# Patient Record
Sex: MALE | Employment: OTHER | URBAN - NONMETROPOLITAN AREA
[De-identification: names, ages, dates, MRNs, and addresses within clinical notes are randomized per-mention and may not be internally consistent; named-entity substitution may affect disease eponyms.]

---

## 2018-02-14 ENCOUNTER — OUTSIDE SERVICES (OUTPATIENT)
Dept: FAMILY MEDICINE CLINIC | Age: 83
End: 2018-02-14
Payer: MEDICARE

## 2018-02-14 DIAGNOSIS — I25.10 CORONARY ARTERY DISEASE INVOLVING NATIVE HEART WITHOUT ANGINA PECTORIS, UNSPECIFIED VESSEL OR LESION TYPE: ICD-10-CM

## 2018-02-14 DIAGNOSIS — R29.898 COGWHEEL RIGIDITY: ICD-10-CM

## 2018-02-14 DIAGNOSIS — R25.1 TREMOR: ICD-10-CM

## 2018-02-14 DIAGNOSIS — F03.91 DEMENTIA WITH BEHAVIORAL DISTURBANCE, UNSPECIFIED DEMENTIA TYPE: ICD-10-CM

## 2018-02-14 DIAGNOSIS — E78.5 HYPERLIPIDEMIA, UNSPECIFIED HYPERLIPIDEMIA TYPE: ICD-10-CM

## 2018-02-14 DIAGNOSIS — F41.9 ANXIETY: ICD-10-CM

## 2018-02-14 DIAGNOSIS — R25.8 BRADYKINESIA: ICD-10-CM

## 2018-02-14 DIAGNOSIS — G47.33 OSA (OBSTRUCTIVE SLEEP APNEA): ICD-10-CM

## 2018-02-14 DIAGNOSIS — K21.9 GASTROESOPHAGEAL REFLUX DISEASE WITHOUT ESOPHAGITIS: ICD-10-CM

## 2018-02-14 DIAGNOSIS — G40.909 SEIZURE DISORDER (HCC): ICD-10-CM

## 2018-02-14 DIAGNOSIS — I10 ESSENTIAL HYPERTENSION: Primary | ICD-10-CM

## 2018-02-14 PROCEDURE — 1123F ACP DISCUSS/DSCN MKR DOCD: CPT | Performed by: FAMILY MEDICINE

## 2018-02-14 PROCEDURE — 99309 SBSQ NF CARE MODERATE MDM 30: CPT | Performed by: FAMILY MEDICINE

## 2018-02-14 NOTE — PROGRESS NOTES
[address] [address]  [place] [id number]  Dept: [phone]      Merritt Kasper is a 80 y.o. male being seen for his routine visit. Location of visit: [facility]    HPI:  Admission History: Yanely Dee was admitted to [facility] on 7/16/16 after his wife took him to [facility] for worsening mental status and dementia with aggression stating that she can no longer take care of him. He was extremely agitated and combative while there. He was discharged to [place] [address] on 9/2/16. He is a [facility]    Last visit: No changes were made    Faxes since last seen: 1/30 fever, cough- Flu test, CXR, Rocephin 1 gram IM q24hrs x 3 days  1/30 CXR- R pneumonia- Albuterol UD aerosol x 5 days, recheck CXR in 3 days  2/2 CXR - pneumonia was clearing    New today: He has been getting up and sitting in his chair during the day and is doing well  The nurses report less behaviors  He is recovering well from his pneumonia    ROS:  General Constitutional: Denies fever. Denies lightheadedness. Respiratory: Denies cough. Denies shortness of breath. Denies wheezing. Cardiovascular: Denies chest pain at rest. Denies irregular heartbeat. Gastrointestinal: Denies abdominal pain. Denies blood in the stool. Denies constipation. Denies diarrhea. Denies nausea. Denies vomiting. Genitourinary: Denies blood in the urine. Denies difficulty urinating. Denies frequent urination. Denies painful urination. Skin:  Denies rash. Neurologic: Denies falls. Denies dizziness. Psychiatric: Denies sleep disturbance. Denies anxiety. Denies depressed mood. PHYSICAL EXAM:    General Appearance: in no acute distress, well developed, well nourished, asleep in bed, rouses with touch  Eyes: pupils equal, round reactive to light and accommodation. .  Nose: nares patent, no lesions.   Oral Cavity: mucosa moist.  Neck/Thyroid:  no cervical lymphadenopathy, no thyromegaly  Skin: warm and dry  Heart:

## 2018-03-14 ENCOUNTER — OUTSIDE SERVICES (OUTPATIENT)
Dept: FAMILY MEDICINE CLINIC | Age: 83
End: 2018-03-14
Payer: MEDICARE

## 2018-03-14 DIAGNOSIS — G40.909 SEIZURE DISORDER (HCC): ICD-10-CM

## 2018-03-14 DIAGNOSIS — I25.10 CORONARY ARTERY DISEASE INVOLVING NATIVE HEART WITHOUT ANGINA PECTORIS, UNSPECIFIED VESSEL OR LESION TYPE: ICD-10-CM

## 2018-03-14 DIAGNOSIS — K21.9 GASTROESOPHAGEAL REFLUX DISEASE WITHOUT ESOPHAGITIS: ICD-10-CM

## 2018-03-14 DIAGNOSIS — R25.8 BRADYKINESIA: ICD-10-CM

## 2018-03-14 DIAGNOSIS — F41.9 ANXIETY: ICD-10-CM

## 2018-03-14 DIAGNOSIS — F03.91 DEMENTIA WITH BEHAVIORAL DISTURBANCE, UNSPECIFIED DEMENTIA TYPE: ICD-10-CM

## 2018-03-14 DIAGNOSIS — E78.5 HYPERLIPIDEMIA, UNSPECIFIED HYPERLIPIDEMIA TYPE: ICD-10-CM

## 2018-03-14 DIAGNOSIS — R29.898 COGWHEEL RIGIDITY: ICD-10-CM

## 2018-03-14 DIAGNOSIS — I10 ESSENTIAL HYPERTENSION: Primary | ICD-10-CM

## 2018-03-14 DIAGNOSIS — R25.1 TREMOR: ICD-10-CM

## 2018-03-14 DIAGNOSIS — G47.33 OSA (OBSTRUCTIVE SLEEP APNEA): ICD-10-CM

## 2018-03-14 PROBLEM — F03.918 DEMENTIA WITH BEHAVIORAL DISTURBANCE: Status: ACTIVE | Noted: 2018-03-14

## 2018-03-14 PROCEDURE — 99309 SBSQ NF CARE MODERATE MDM 30: CPT | Performed by: FAMILY MEDICINE

## 2018-03-14 PROCEDURE — 1123F ACP DISCUSS/DSCN MKR DOCD: CPT | Performed by: FAMILY MEDICINE

## 2018-04-25 ENCOUNTER — OUTSIDE SERVICES (OUTPATIENT)
Dept: FAMILY MEDICINE CLINIC | Age: 83
End: 2018-04-25
Payer: MEDICARE

## 2018-04-25 DIAGNOSIS — I25.10 CORONARY ARTERY DISEASE INVOLVING NATIVE HEART WITHOUT ANGINA PECTORIS, UNSPECIFIED VESSEL OR LESION TYPE: ICD-10-CM

## 2018-04-25 DIAGNOSIS — G47.33 OSA (OBSTRUCTIVE SLEEP APNEA): ICD-10-CM

## 2018-04-25 DIAGNOSIS — R25.1 TREMOR: ICD-10-CM

## 2018-04-25 DIAGNOSIS — I10 ESSENTIAL HYPERTENSION: Primary | ICD-10-CM

## 2018-04-25 DIAGNOSIS — R29.898 COGWHEEL RIGIDITY: ICD-10-CM

## 2018-04-25 DIAGNOSIS — R25.8 BRADYKINESIA: ICD-10-CM

## 2018-04-25 DIAGNOSIS — E78.5 HYPERLIPIDEMIA, UNSPECIFIED HYPERLIPIDEMIA TYPE: ICD-10-CM

## 2018-04-25 DIAGNOSIS — K21.9 GASTROESOPHAGEAL REFLUX DISEASE WITHOUT ESOPHAGITIS: ICD-10-CM

## 2018-04-25 DIAGNOSIS — F41.9 ANXIETY: ICD-10-CM

## 2018-04-25 DIAGNOSIS — F03.91 DEMENTIA WITH BEHAVIORAL DISTURBANCE, UNSPECIFIED DEMENTIA TYPE: ICD-10-CM

## 2018-04-25 PROCEDURE — 1123F ACP DISCUSS/DSCN MKR DOCD: CPT | Performed by: FAMILY MEDICINE

## 2018-04-25 PROCEDURE — 99308 SBSQ NF CARE LOW MDM 20: CPT | Performed by: FAMILY MEDICINE

## 2018-05-23 ENCOUNTER — OUTSIDE SERVICES (OUTPATIENT)
Dept: FAMILY MEDICINE CLINIC | Age: 83
End: 2018-05-23
Payer: MEDICARE

## 2018-05-23 DIAGNOSIS — F41.9 ANXIETY: ICD-10-CM

## 2018-05-23 DIAGNOSIS — F03.91 DEMENTIA WITH BEHAVIORAL DISTURBANCE, UNSPECIFIED DEMENTIA TYPE: ICD-10-CM

## 2018-05-23 DIAGNOSIS — I10 ESSENTIAL HYPERTENSION: Primary | ICD-10-CM

## 2018-05-23 DIAGNOSIS — E78.5 HYPERLIPIDEMIA, UNSPECIFIED HYPERLIPIDEMIA TYPE: ICD-10-CM

## 2018-05-23 PROCEDURE — 99309 SBSQ NF CARE MODERATE MDM 30: CPT | Performed by: FAMILY MEDICINE

## 2018-05-23 PROCEDURE — 1123F ACP DISCUSS/DSCN MKR DOCD: CPT | Performed by: FAMILY MEDICINE

## 2018-06-27 ENCOUNTER — OUTSIDE SERVICES (OUTPATIENT)
Dept: FAMILY MEDICINE CLINIC | Age: 83
End: 2018-06-27
Payer: MEDICARE

## 2018-06-27 DIAGNOSIS — G40.909 SEIZURE DISORDER (HCC): Primary | ICD-10-CM

## 2018-06-27 DIAGNOSIS — F03.91 DEMENTIA WITH BEHAVIORAL DISTURBANCE, UNSPECIFIED DEMENTIA TYPE: ICD-10-CM

## 2018-06-27 DIAGNOSIS — G47.33 OSA (OBSTRUCTIVE SLEEP APNEA): ICD-10-CM

## 2018-06-27 DIAGNOSIS — E87.6 HYPOKALEMIA: ICD-10-CM

## 2018-06-27 DIAGNOSIS — E78.5 HYPERLIPIDEMIA, UNSPECIFIED HYPERLIPIDEMIA TYPE: ICD-10-CM

## 2018-06-27 DIAGNOSIS — K21.9 GASTROESOPHAGEAL REFLUX DISEASE WITHOUT ESOPHAGITIS: ICD-10-CM

## 2018-06-27 DIAGNOSIS — I10 ESSENTIAL HYPERTENSION: ICD-10-CM

## 2018-06-27 DIAGNOSIS — I25.10 CORONARY ARTERY DISEASE INVOLVING NATIVE HEART WITHOUT ANGINA PECTORIS, UNSPECIFIED VESSEL OR LESION TYPE: ICD-10-CM

## 2018-06-27 DIAGNOSIS — F41.9 ANXIETY: ICD-10-CM

## 2018-06-27 PROCEDURE — 99308 SBSQ NF CARE LOW MDM 20: CPT | Performed by: FAMILY MEDICINE

## 2018-06-27 PROCEDURE — 1123F ACP DISCUSS/DSCN MKR DOCD: CPT | Performed by: FAMILY MEDICINE

## 2018-07-18 ENCOUNTER — OUTSIDE SERVICES (OUTPATIENT)
Dept: FAMILY MEDICINE CLINIC | Age: 83
End: 2018-07-18
Payer: MEDICARE

## 2018-07-18 DIAGNOSIS — I25.10 CORONARY ARTERY DISEASE INVOLVING NATIVE HEART WITHOUT ANGINA PECTORIS, UNSPECIFIED VESSEL OR LESION TYPE: ICD-10-CM

## 2018-07-18 DIAGNOSIS — E78.5 HYPERLIPIDEMIA, UNSPECIFIED HYPERLIPIDEMIA TYPE: ICD-10-CM

## 2018-07-18 DIAGNOSIS — E55.9 VITAMIN D DEFICIENCY: ICD-10-CM

## 2018-07-18 DIAGNOSIS — I10 ESSENTIAL HYPERTENSION: Primary | ICD-10-CM

## 2018-07-18 DIAGNOSIS — K21.9 GASTROESOPHAGEAL REFLUX DISEASE WITHOUT ESOPHAGITIS: ICD-10-CM

## 2018-07-18 DIAGNOSIS — F41.9 ANXIETY: ICD-10-CM

## 2018-07-18 DIAGNOSIS — M24.50 FLEXION CONTRACTURES: ICD-10-CM

## 2018-07-18 DIAGNOSIS — F03.91 DEMENTIA WITH BEHAVIORAL DISTURBANCE, UNSPECIFIED DEMENTIA TYPE: ICD-10-CM

## 2018-07-18 PROCEDURE — 1123F ACP DISCUSS/DSCN MKR DOCD: CPT | Performed by: FAMILY MEDICINE

## 2018-07-18 PROCEDURE — 1101F PT FALLS ASSESS-DOCD LE1/YR: CPT | Performed by: FAMILY MEDICINE

## 2018-07-18 PROCEDURE — 99308 SBSQ NF CARE LOW MDM 20: CPT | Performed by: FAMILY MEDICINE

## 2018-07-18 NOTE — PROGRESS NOTES
responsive, moves extremities. Psych: minimal response, opens his eyes and then closes them and turns his head the other way  Flexion Contractures    ASSESSMENT:   Diagnosis Orders   1. Essential hypertension     2. Gastroesophageal reflux disease without esophagitis     3. Dementia with behavioral disturbance, unspecified dementia type     4. Hyperlipidemia, unspecified hyperlipidemia type     5. Coronary artery disease involving native heart without angina pectoris, unspecified vessel or lesion type     6. Anxiety     7. Flexion contractures     8. Vitamin D deficiency         PLAN:  I will add a Tramadol at 4 pm to see if his helps with the calling out  I will also order Vitamin D labs    I, Dr. Darius Ochoa, personally performed the services described in this documentation as scribed by YVES Schwarz in my presence, and is both accurate and complete.

## 2018-08-15 ENCOUNTER — OUTSIDE SERVICES (OUTPATIENT)
Dept: FAMILY MEDICINE CLINIC | Age: 83
End: 2018-08-15

## 2018-08-15 DIAGNOSIS — F41.9 ANXIETY: ICD-10-CM

## 2018-08-15 DIAGNOSIS — R25.1 TREMOR: ICD-10-CM

## 2018-08-15 DIAGNOSIS — M24.50 FLEXION CONTRACTURES: ICD-10-CM

## 2018-08-15 DIAGNOSIS — I10 ESSENTIAL HYPERTENSION: ICD-10-CM

## 2018-08-15 DIAGNOSIS — K21.9 GASTROESOPHAGEAL REFLUX DISEASE WITHOUT ESOPHAGITIS: ICD-10-CM

## 2018-08-15 DIAGNOSIS — F03.91 DEMENTIA WITH BEHAVIORAL DISTURBANCE, UNSPECIFIED DEMENTIA TYPE: ICD-10-CM

## 2018-08-15 DIAGNOSIS — R29.898 COGWHEEL RIGIDITY: ICD-10-CM

## 2018-08-15 DIAGNOSIS — E55.9 VITAMIN D DEFICIENCY: ICD-10-CM

## 2018-08-15 DIAGNOSIS — E78.5 HYPERLIPIDEMIA, UNSPECIFIED HYPERLIPIDEMIA TYPE: ICD-10-CM

## 2018-08-15 DIAGNOSIS — G40.909 SEIZURE DISORDER (HCC): Primary | ICD-10-CM

## 2018-08-15 NOTE — PROGRESS NOTES
[address] [address]  [phone]  Dept: [phone]    YVES GERBER am scribing for and in the presence of Dr. Darius Ochoa. 8/15/18/9:56 am/LUIS    Jasper Tran is a 80 y.o. male being seen for his monthly follow up. Location of visit: [facility]    HPI:  Admission History:  Jose Restrepo was admitted to [facility] on 7/16/16 after his wife took him to [facility] for worsening mental status and dementia with aggression stating that she can no longer take care of him. He was extremely agitated and combative while there. He was discharged to [place] [address] on 9/2/16. He is a [facility]. Last visit: 7/18/18 I started Tramadol and ordered Vit. D labs    Faxes since last seen:  o 7/26/18: Tramadol 50 mg #60 refilled, ICP  o 7/27/18: tramadol refilled #100, ICP  o 7/27/18: Labs from 7/25 recd, no changes made. New today: Jose Restrepo is doing well, he is being discharging to another facility but has no other immediate needs    ROS:  General Constitutional: Denies fever. Denies lightheadedness. Respiratory: Denies cough. Denies shortness of breath. Denies wheezing. Cardiovascular: Denies chest pain at rest.  Gastrointestinal: Denies abdominal pain. Denies blood in the stool. Denies constipation. Denies diarrhea. Denies nausea. Denies vomiting. Genitourinary: Denies blood in the urine. Denies painful urination. Skin:  Denies rash. Neurologic: Denies falls. Denies dizziness. Psychiatric: Denies sleep disturbance. Denies anxiety. Denies depressed mood. PHYSICAL EXAM:    General Appearance: in no acute distress, well nourished, sitting in his wheelchair in the dining room  Eyes: pupils equal, round reactive to light and accommodation. Oral Cavity: mucosa moist.  Neck/Thyroid:  no cervical lymphadenopathy, no thyromegaly  Skin: warm and dry  Heart: regular rate and rhythm. No murmurs. S1, S2 normal, no gallops.   Lungs: clear to auscultation

## 2018-09-12 ENCOUNTER — OUTSIDE SERVICES (OUTPATIENT)
Dept: FAMILY MEDICINE CLINIC | Age: 83
End: 2018-09-12
Payer: MEDICARE

## 2018-09-12 DIAGNOSIS — I10 ESSENTIAL HYPERTENSION: ICD-10-CM

## 2018-09-12 DIAGNOSIS — E55.9 VITAMIN D DEFICIENCY: ICD-10-CM

## 2018-09-12 DIAGNOSIS — K21.9 GASTROESOPHAGEAL REFLUX DISEASE WITHOUT ESOPHAGITIS: ICD-10-CM

## 2018-09-12 DIAGNOSIS — R29.898 COGWHEEL RIGIDITY: ICD-10-CM

## 2018-09-12 DIAGNOSIS — I25.10 CORONARY ARTERY DISEASE INVOLVING NATIVE HEART WITHOUT ANGINA PECTORIS, UNSPECIFIED VESSEL OR LESION TYPE: ICD-10-CM

## 2018-09-12 DIAGNOSIS — F03.91 DEMENTIA WITH BEHAVIORAL DISTURBANCE, UNSPECIFIED DEMENTIA TYPE: ICD-10-CM

## 2018-09-12 DIAGNOSIS — G47.33 OSA (OBSTRUCTIVE SLEEP APNEA): ICD-10-CM

## 2018-09-12 DIAGNOSIS — R25.1 TREMOR: ICD-10-CM

## 2018-09-12 DIAGNOSIS — M24.50 FLEXION CONTRACTURES: ICD-10-CM

## 2018-09-12 DIAGNOSIS — F41.9 ANXIETY: ICD-10-CM

## 2018-09-12 DIAGNOSIS — R25.8 BRADYKINESIA: ICD-10-CM

## 2018-09-12 DIAGNOSIS — G40.909 SEIZURE DISORDER (HCC): Primary | ICD-10-CM

## 2018-09-12 DIAGNOSIS — E78.5 HYPERLIPIDEMIA, UNSPECIFIED HYPERLIPIDEMIA TYPE: ICD-10-CM

## 2018-09-12 PROCEDURE — 99309 SBSQ NF CARE MODERATE MDM 30: CPT | Performed by: FAMILY MEDICINE

## 2018-09-12 PROCEDURE — 1123F ACP DISCUSS/DSCN MKR DOCD: CPT | Performed by: FAMILY MEDICINE

## 2018-09-12 PROCEDURE — 1101F PT FALLS ASSESS-DOCD LE1/YR: CPT | Performed by: FAMILY MEDICINE

## 2019-03-14 ENCOUNTER — OUTSIDE SERVICES (OUTPATIENT)
Dept: FAMILY MEDICINE CLINIC | Age: 84
End: 2019-03-14
Payer: MEDICARE

## 2019-03-14 DIAGNOSIS — I10 ESSENTIAL HYPERTENSION: ICD-10-CM

## 2019-03-14 DIAGNOSIS — K21.9 GASTROESOPHAGEAL REFLUX DISEASE WITHOUT ESOPHAGITIS: ICD-10-CM

## 2019-03-14 DIAGNOSIS — F41.9 ANXIETY: ICD-10-CM

## 2019-03-14 DIAGNOSIS — I25.10 CORONARY ARTERY DISEASE INVOLVING NATIVE HEART WITHOUT ANGINA PECTORIS, UNSPECIFIED VESSEL OR LESION TYPE: ICD-10-CM

## 2019-03-14 DIAGNOSIS — G40.909 SEIZURE DISORDER (HCC): ICD-10-CM

## 2019-03-14 DIAGNOSIS — F03.91 DEMENTIA WITH BEHAVIORAL DISTURBANCE, UNSPECIFIED DEMENTIA TYPE: Primary | ICD-10-CM

## 2019-03-14 PROBLEM — R29.898 COGWHEEL RIGIDITY: Status: RESOLVED | Noted: 2018-03-14 | Resolved: 2019-03-14

## 2019-03-14 PROCEDURE — G8484 FLU IMMUNIZE NO ADMIN: HCPCS | Performed by: FAMILY MEDICINE

## 2019-03-14 PROCEDURE — 1101F PT FALLS ASSESS-DOCD LE1/YR: CPT | Performed by: FAMILY MEDICINE

## 2019-03-14 PROCEDURE — 1123F ACP DISCUSS/DSCN MKR DOCD: CPT | Performed by: FAMILY MEDICINE

## 2019-03-14 PROCEDURE — 99308 SBSQ NF CARE LOW MDM 20: CPT | Performed by: FAMILY MEDICINE

## 2019-05-04 PROBLEM — F41.9 ANXIETY: Chronic | Status: ACTIVE | Noted: 2018-03-14

## 2019-05-04 PROBLEM — N40.1 BENIGN PROSTATIC HYPERPLASIA WITH WEAK URINARY STREAM: Chronic | Status: ACTIVE | Noted: 2019-05-04

## 2019-05-04 PROBLEM — F03.918 DEMENTIA WITH BEHAVIORAL DISTURBANCE: Chronic | Status: ACTIVE | Noted: 2018-03-14

## 2019-05-04 PROBLEM — R39.12 BENIGN PROSTATIC HYPERPLASIA WITH WEAK URINARY STREAM: Chronic | Status: ACTIVE | Noted: 2019-05-04

## 2019-05-04 PROBLEM — G40.909 SEIZURE DISORDER (HCC): Chronic | Status: ACTIVE | Noted: 2018-03-14

## 2019-05-04 NOTE — PROGRESS NOTES
Kelvin Setele is a 80 y.o. male that is being seen at [facility] for Dementia      Kelvin Steele  6/1/1932      HPI:  Patient seen and examined at bedside. History obtained from patient and chart. Patient is not able to give any reliable hx. No new issues per chart. I have reviewed the patient's past medical history, past surgical history, allergies,medications, social and family history and I have made updates where appropriate. Past Medical History:   Diagnosis Date    Anxiety     Arthritis     gout    Dementia     GERD (gastroesophageal reflux disease)     Gout     Hyperlipidemia     Hypertension     Seizures (HCC)        No past surgical history on file. Social History     Tobacco Use    Smoking status: Unknown If Ever Smoked   Substance Use Topics    Alcohol use: Not on file    Drug use: Not on file       No family history on file. Review of Systems        PHYSICAL EXAM:    Weight - 185 lbs  BP - 131/63  Temp - 98.9  HR - 51  RR - 16    Physical Exam   Constitutional: He appears well-developed and well-nourished. No distress. HENT:   Head: Normocephalic and atraumatic. Right Ear: Hearing and external ear normal.   Left Ear: Hearing and external ear normal.   Nose: Nose normal.   Mouth/Throat: Oropharynx is clear and moist.   Eyes: Conjunctivae are normal. Right eye exhibits no discharge. Left eye exhibits no discharge. Neck: Normal range of motion. Neck supple. No tracheal deviation present. No thyromegaly present. Cardiovascular: Normal rate, regular rhythm and normal heart sounds. Exam reveals no gallop and no friction rub. No murmur heard. Pulmonary/Chest: Effort normal. No respiratory distress. He has no wheezes. He has no rales. He exhibits no tenderness. Musculoskeletal: He exhibits no deformity. Lymphadenopathy:     He has no cervical adenopathy. Neurological: He is alert. He exhibits normal muscle tone. Coordination normal.   Skin: Skin is warm and dry.  No rash noted. No erythema. Psychiatric: He has a normal mood and affect. He is withdrawn. Cognition and memory are impaired. He expresses inappropriate judgment. Nursing note and vitals reviewed. ASSESSMENT & PLAN  Honey Castleman was seen today for dementia. Diagnoses and all orders for this visit:    Essential hypertension    Anxiety    Seizure disorder ([facility] [address]    Dementia with behavioral disturbance, unspecified dementia type    Benign prostatic hyperplasia with weak urinary stream        1. Dementia:  cont Seroquel  2. Anxiety:  cont lorazepam, Remeron, Zoloft  3. Chronic Pain:  cont Tramadol  4. Gout:  cont allopurinol  5. GERD:  cont Carafate, Cimetidine  6. BPH:  cont Flomax, Finastride  7. Seizures:  cont Divalproex, Keppra  8.  AR:  cont Fluticasone

## 2019-05-12 ENCOUNTER — OUTSIDE SERVICES (OUTPATIENT)
Dept: FAMILY MEDICINE CLINIC | Age: 84
End: 2019-05-12
Payer: MEDICARE

## 2019-05-12 DIAGNOSIS — I10 ESSENTIAL HYPERTENSION: Primary | Chronic | ICD-10-CM

## 2019-05-12 DIAGNOSIS — R39.12 BENIGN PROSTATIC HYPERPLASIA WITH WEAK URINARY STREAM: Chronic | ICD-10-CM

## 2019-05-12 DIAGNOSIS — F41.9 ANXIETY: Chronic | ICD-10-CM

## 2019-05-12 DIAGNOSIS — N40.1 BENIGN PROSTATIC HYPERPLASIA WITH WEAK URINARY STREAM: Chronic | ICD-10-CM

## 2019-05-12 DIAGNOSIS — G40.909 SEIZURE DISORDER (HCC): Chronic | ICD-10-CM

## 2019-05-12 DIAGNOSIS — F03.91 DEMENTIA WITH BEHAVIORAL DISTURBANCE, UNSPECIFIED DEMENTIA TYPE: Chronic | ICD-10-CM

## 2019-05-12 PROCEDURE — 1123F ACP DISCUSS/DSCN MKR DOCD: CPT | Performed by: FAMILY MEDICINE

## 2019-05-12 PROCEDURE — 99308 SBSQ NF CARE LOW MDM 20: CPT | Performed by: FAMILY MEDICINE

## 2019-09-13 ENCOUNTER — OUTSIDE SERVICES (OUTPATIENT)
Dept: FAMILY MEDICINE CLINIC | Age: 84
End: 2019-09-13
Payer: MEDICARE

## 2019-09-13 DIAGNOSIS — F03.91 DEMENTIA WITH BEHAVIORAL DISTURBANCE, UNSPECIFIED DEMENTIA TYPE: Chronic | ICD-10-CM

## 2019-09-13 DIAGNOSIS — I10 ESSENTIAL HYPERTENSION: Chronic | ICD-10-CM

## 2019-09-13 DIAGNOSIS — G40.909 SEIZURE DISORDER (HCC): Chronic | ICD-10-CM

## 2019-09-13 DIAGNOSIS — F41.9 ANXIETY: Chronic | ICD-10-CM

## 2019-09-13 DIAGNOSIS — N40.1 BENIGN PROSTATIC HYPERPLASIA WITH WEAK URINARY STREAM: Primary | Chronic | ICD-10-CM

## 2019-09-13 DIAGNOSIS — R39.12 BENIGN PROSTATIC HYPERPLASIA WITH WEAK URINARY STREAM: Primary | Chronic | ICD-10-CM

## 2019-09-13 PROCEDURE — 99308 SBSQ NF CARE LOW MDM 20: CPT | Performed by: FAMILY MEDICINE

## 2020-01-16 ENCOUNTER — OUTSIDE SERVICES (OUTPATIENT)
Dept: FAMILY MEDICINE CLINIC | Age: 85
End: 2020-01-16
Payer: MEDICARE

## 2020-01-16 VITALS
WEIGHT: 171 LBS | HEART RATE: 64 BPM | BODY MASS INDEX: 23.85 KG/M2 | DIASTOLIC BLOOD PRESSURE: 67 MMHG | TEMPERATURE: 97.8 F | RESPIRATION RATE: 18 BRPM | SYSTOLIC BLOOD PRESSURE: 105 MMHG

## 2020-01-16 PROCEDURE — 99308 SBSQ NF CARE LOW MDM 20: CPT | Performed by: FAMILY MEDICINE

## 2020-01-17 NOTE — PROGRESS NOTES
Kristel Noe is a 80 y.o. male that is being seen at [facility] for Dementia      Kristel Noe  6/1/1932      HPI:  Patient seen and examined at bedside. History obtained from patient and chart. Patient is not able to give any reliable hx. No new issues per chart. I have reviewed the patient's past medical history, past surgical history, allergies,medications, social and family history and I have made updates where appropriate. Past Medical History:   Diagnosis Date    Anxiety     Arthritis     gout    Dementia     GERD (gastroesophageal reflux disease)     Gout     Hyperlipidemia     Hypertension     Seizures (HCC)        No past surgical history on file. Social History     Tobacco Use    Smoking status: Unknown If Ever Smoked   Substance Use Topics    Alcohol use: Not on file    Drug use: Not on file       No family history on file. Review of Systems        PHYSICAL EXAM:    /67   Pulse 64   Temp 97.8 °F (36.6 °C)   Resp 18   Wt 171 lb (77.6 kg)   BMI 23.85 kg/m²       Physical Exam  Vitals signs and nursing note reviewed. Constitutional:       General: He is not in acute distress. Appearance: He is well-developed. HENT:      Head: Normocephalic and atraumatic. Right Ear: Hearing and external ear normal.      Left Ear: Hearing and external ear normal.      Nose: Nose normal.   Eyes:      General:         Right eye: No discharge. Left eye: No discharge. Conjunctiva/sclera: Conjunctivae normal.   Neck:      Musculoskeletal: Normal range of motion and neck supple. Thyroid: No thyromegaly. Trachea: No tracheal deviation. Cardiovascular:      Rate and Rhythm: Normal rate and regular rhythm. Heart sounds: Normal heart sounds. No murmur. No friction rub. No gallop. Pulmonary:      Effort: Pulmonary effort is normal. No respiratory distress. Breath sounds: No wheezing or rales. Chest:      Chest wall: No tenderness. Musculoskeletal:         General: No deformity. Lymphadenopathy:      Cervical: No cervical adenopathy. Skin:     General: Skin is warm and dry. Findings: No erythema or rash. Neurological:      Mental Status: He is alert. Motor: No abnormal muscle tone. Coordination: Coordination normal.   Psychiatric:         Behavior: Behavior is withdrawn. Cognition and Memory: Memory is impaired. Judgment: Judgment is inappropriate. ASSESSMENT & PLAN  Bobo Patiño was seen today for dementia. Diagnoses and all orders for this visit:    Seizure disorder ([facility] [address]    Essential hypertension    Dementia with behavioral disturbance, unspecified dementia type ([facility] [address]    Benign prostatic hyperplasia with weak urinary stream    Anxiety        1. Dementia:  cont Seroquel  2. Anxiety:  cont lorazepam, Remeron, Zoloft  3. Chronic Pain:  cont Tramadol  4. Gout:  cont allopurinol  5. GERD:  cont Carafate, Cimetidine  6. BPH:  cont Flomax, Finastride  7. Seizures:  cont Divalproex, Keppra  8.  AR:  cont Fluticasone

## 2020-03-08 ENCOUNTER — OUTSIDE SERVICES (OUTPATIENT)
Dept: FAMILY MEDICINE CLINIC | Age: 85
End: 2020-03-08
Payer: MEDICARE

## 2020-03-08 PROCEDURE — 99308 SBSQ NF CARE LOW MDM 20: CPT | Performed by: NURSE PRACTITIONER

## 2020-03-08 NOTE — PROGRESS NOTES
NAME: Felipa Kothari   DATE: 3-9-2020    : 32   ROOM: [facility]   CODE STATUS: DNRCC   REASON FOR VISIT: Follow up of chronic medical conditions    HPI: Patient seen and examined at bedside. History obtain form patient and chart. Patient is at baseline mentation, without any appearance of pain or distress. Makes eye contact, however does not appear to recognize his environment. Weights have been stable for several months. BP stable. There are no new concerns per staff.      PMHx: Seizures, HTN, GERD, Dementia, Anxiety, CAD   PSHx: Cataracts, Shoulder surgery, Cholecystectomy   Social Hx: never smoker, no EtOH   FamHx: HTN, CAD, COPD, Colon CA    Allergies, medications, labs and radiology reports were reviewed through chart review.  Patients past medical, surgical, social and family history have been reviewed and updates were made where appropriate.     Review of Systems (Patient unable to offer any insight)   Positive responses are noted with +    Constitutional: Fever, Chills, Fatigue, Unexpected changes in weight   Eyes: Eye discharge, Eye pain, Eye redness, Visual disturbances   HENT: Ear pain, Tinnitus, Nosebleeds, Trouble swallowing   Cardiovascular: Chest Pain, Palpitations   Respiratory: Cough, Wheezing, Shortness of breath, Chest tightness, Apnea   Gastrointestinal: Nausea, Vomiting, Diarrhea, Constipation, Heartburn, Blood in stool   Genitourinary: Difficulty or painful urination, Flank pain, Change in frequency, Urgency   Skin: Color change, Rash, Itching, Wound   Psychiatric: Hallucinations, Anxiety, Depression, Suicidal ideation   Hematological: Enlarged glands, Easybleeding, Easily bruising   Musculoskeletal: Joint pain, Back pain, Gait problems, Joint swelling, Myalgias   Neurological: Dizziness, Headaches, Presyncope, Numbness, Seizures, Tremors   Allergy: Environmental allergies, Food allergies   Endocrine: HeatIntolerance, Cold Intolerance, Polydipsia, Polyphagia, Polyuria     PHYSICAL EXAM  T, BP, Final          Stage of CKD     eGFR (mL/min/1.73 square meters)          Stage 1        >/= 90        or > 89          Stage 2        60 - 89          Stage 3        30 - 59          Stage 4        15 - 29          Stage 5        </= 14        or < 15  ** GFR is reliable for adults 17 to 69 years with stable kidney      function.       CALCIUM 8.9 mg/dL 8.4-10.2  Final           ASSESSMENT AND PLAN   1. Dementia: with Bipolar disorder. Not on medication. No change in POC.    2. Anxiety:Continue Remeron, Zoloft   3. Chronic Pain: Continue Tramadol and Tylenol    4. Gout: Without s/s. continue allopurinol   5. GERD: continue Carafate, and Pepcid.    6. BPH: continue Flomax, Finastride   7. Seizures: No recent seizures reported. Continue Divalproex, Keppra   8. AR:continue Fluticason   9. Constipation: Continue on Miralax, Bisacodyl, MOM. 10. HTN: Continue on Clonidine    Antipsychotic/Antianxiety/Hypnotic/Psychotropic/Sedation/Antidepressant medications are continued at this time because discontinuation may result in adverse effects or return of concerning behaviors/symptoms.      Plan of care reviewed with Dr Scott Ndiaye, CNP

## 2020-04-02 ENCOUNTER — OUTSIDE SERVICES (OUTPATIENT)
Dept: FAMILY MEDICINE CLINIC | Age: 85
End: 2020-04-02
Payer: MEDICARE

## 2020-04-02 PROCEDURE — 99308 SBSQ NF CARE LOW MDM 20: CPT | Performed by: NURSE PRACTITIONER

## 2020-04-03 NOTE — PROGRESS NOTES
[facility] Progress Note    NAME: Amanda Herzog  DATE: 20  ROOM #: B 23-1  : 1932  REASON FOR VISIT: Other (routine visit)    CODE STATUS: DNR/CC    History obtained from chart review, the patient and staff. SUBJECTIVE:  HPI: Amanda Herzog is a 80 y.o. male. Patient has a PMH significant for:  Past Medical History:   Diagnosis Date    Anxiety     Arthritis     gout    Dementia     GERD (gastroesophageal reflux disease)     Gout     Hyperlipidemia     Hypertension     Seizures ([facility] [address]        Pt seen and examined at bedside today and is noted to be in no acute distress. Staff provide appropriate updates; appreciate staff input. I have reviewed patients past medical, surgical, social, and family history and have made updates where appropriate. Allergies and Medications were reviewed through the [facility] EMR.     Patient Active Problem List    Diagnosis Date Noted    Benign prostatic hyperplasia with weak urinary stream 2019    Flexion contractures 2018    Vitamin D deficiency 2018    Dementia with behavioral disturbance ([facility] [address] 2018    Anxiety 2018    HLD (hyperlipidemia) 2018    JANAY (obstructive sleep apnea) 2018    Seizure disorder ([facility] [address] 2018    CAD (coronary artery disease) 2018    Bradykinesia 2018    Tremor 2018    Essential hypertension 2016    Gastroesophageal reflux disease without esophagitis 2016    Hypokalemia 2016       REVIEW OF SYSTEMS  Positive responses are highlighted in bold  Constitutional:  Fever, Chills, Night Sweats, Fatigue, Unexpected changes in weight  HENT:  Ear pain, Tinnitus, Nosebleeds, Trouble swallowing, Hearing loss, Sore throat  Cardiovascular:  Chest Pain, Palpitations, Orthopnea, Paroxysmal Nocturnal Dyspnea  Respiratory:  Cough, Wheezing, Shortness of breath, Chest tightness, Apnea  Gastrointestinal:  Nausea, Vomiting, Diarrhea, Constipation, Heartburn, Blood in
Parent(s)

## 2020-05-06 ENCOUNTER — VIRTUAL VISIT (OUTPATIENT)
Dept: FAMILY MEDICINE CLINIC | Age: 85
End: 2020-05-06
Payer: MEDICARE

## 2020-05-06 VITALS
DIASTOLIC BLOOD PRESSURE: 77 MMHG | RESPIRATION RATE: 16 BRPM | BODY MASS INDEX: 23.43 KG/M2 | HEART RATE: 57 BPM | SYSTOLIC BLOOD PRESSURE: 130 MMHG | TEMPERATURE: 98.2 F | WEIGHT: 168 LBS

## 2020-05-06 PROCEDURE — 1123F ACP DISCUSS/DSCN MKR DOCD: CPT | Performed by: FAMILY MEDICINE

## 2020-05-06 PROCEDURE — 99308 SBSQ NF CARE LOW MDM 20: CPT | Performed by: FAMILY MEDICINE

## 2020-06-08 ENCOUNTER — OUTSIDE SERVICES (OUTPATIENT)
Dept: FAMILY MEDICINE CLINIC | Age: 85
End: 2020-06-08
Payer: MEDICARE

## 2020-06-08 PROCEDURE — 99308 SBSQ NF CARE LOW MDM 20: CPT | Performed by: NURSE PRACTITIONER

## 2020-07-06 ENCOUNTER — OUTSIDE SERVICES (OUTPATIENT)
Dept: FAMILY MEDICINE CLINIC | Age: 85
End: 2020-07-06
Payer: MEDICARE

## 2020-07-06 PROCEDURE — 99308 SBSQ NF CARE LOW MDM 20: CPT | Performed by: NURSE PRACTITIONER

## 2020-07-06 NOTE — PROGRESS NOTES
NAME: Mera Arias   DATE: 2020   : 32   ROOM[facility]   [facility]   REASON FOR VISIT: Follow up of chronic medical conditions    HPI: Patient seen and examined at bedside. History obtain form patient and chart. Patient is at baseline mentation, only briefly opening eyes, and often softly yelling out. No appearance of pain or distress. Vital signs stable. No new concerns reported by nursing.     PMHx: Seizures, HTN, GERD, Dementia, Anxiety, CAD   PSHx: Cataracts, Shoulder surgery, Cholecystectomy   Social Hx: never smoker, no EtOH   FamHx: HTN, CAD, COPD, Colon CA    Allergies, medications, labs and radiology reports were reviewed through chart review.  Patients past medical, surgical, social and family history have been reviewed and updates were made where appropriate.     Review of Systems (Patient unable to offer any insight)   Positive responses are noted with +     No reports of issues by nursing or other staff.      PHYSICAL EXAM  T, BP, P, RR, Wt  98, 168/64, 60, 16, 165#   General Appearance: Elderly male in NAD   Head: normocephalic and atraumatic   Eyes:pupils equal, round, and reactive to light, conjunctivae and eye lids without erythema   ENT: external ear and ear canal normal bilaterally, nose without deformity, nasal mucosa and turbinates normal without polyps, oropharynx normal, dentition is normal for age, no lip or gum lesions noted   Neck: supple and non-tender without mass, no thyromegaly or thyroid nodules, no cervical lymphadenopathy   Pulmonary/Chest: clear to auscultation bilaterally- no wheezes, rales or rhonchi, normal air movement, no respiratory distress or retractions   Cardiovascular: normal rate, regular rhythm, normal S1 and S2, no murmurs, rubs, clicks, or gallops, distal pulses intact   Abdomen: soft, non-tender, non-distended, no rebound or guarding, no masses or hernias noted, no hepatospleenomegaly   Extremities: no cyanosis, clubbing or edema of the lower extremities   Musculoskeletal: No joint swelling or gross deformity   Neuro: Alert, normal muscle strength and tone. Offers no speech. No focal findings or movement disorder noted   Psych: Normal affect without evidence of depression or anxiety, insight and judgement cannot be assessed, memory cannot be assessed.      ASSESSMENT AND PLAN   1. Dementia: With Bipolar disorder. Not on medication. Monitor.   2. Anxiety:Continue Remeron, Sertraline   3. Chronic Pain: Continue Tramadol and Tylenol    4. Gout: Without s/s. continue allopurinol   5. GERD: continue Carafate, and Pepcid.    6. BPH: continue Flomax, Finastride   7. Seizures: No recent seizures reported. Continue Divalproex, Keppra   8. AR:continue Fluticasone   9. Constipation: Continue on Miralax, Bisacodyl, MOM. 10. HTN: Continue on Clonidine    Antipsychotic/Antianxiety/Hypnotic/Psychotropic/Sedation/Antidepressant medications are continued at this time because discontinuation may result in adverse effects or return of concerning behaviors/symptoms.      Plan of care reviewed with Dr Rukhsana Sanchez, CNP

## 2020-08-15 ENCOUNTER — OUTSIDE SERVICES (OUTPATIENT)
Dept: FAMILY MEDICINE CLINIC | Age: 85
End: 2020-08-15
Payer: MEDICARE

## 2020-08-15 VITALS
WEIGHT: 166 LBS | RESPIRATION RATE: 16 BRPM | BODY MASS INDEX: 23.15 KG/M2 | SYSTOLIC BLOOD PRESSURE: 138 MMHG | HEART RATE: 58 BPM | DIASTOLIC BLOOD PRESSURE: 62 MMHG | TEMPERATURE: 97.2 F

## 2020-08-15 PROCEDURE — 99308 SBSQ NF CARE LOW MDM 20: CPT | Performed by: FAMILY MEDICINE

## 2020-08-15 NOTE — PROGRESS NOTES
Musculoskeletal:         General: No deformity. Lymphadenopathy:      Cervical: No cervical adenopathy. Skin:     General: Skin is warm and dry. Findings: No erythema or rash. Neurological:      Mental Status: He is alert. Motor: No abnormal muscle tone. Coordination: Coordination normal.   Psychiatric:         Behavior: Behavior is withdrawn. Cognition and Memory: Memory is impaired. Judgment: Judgment is inappropriate. ASSESSMENT & PLAN  Martha Coronel was seen today for dementia. Diagnoses and all orders for this visit:    Essential hypertension    Dementia with behavioral disturbance, unspecified dementia type (HCC)    Anxiety    Seizure disorder (HCC)    Benign prostatic hyperplasia with weak urinary stream        1. Dementia:  cont Seroquel  2. HTN:  Cont clonidine  3. Anxiety:  cont Remeron, Zoloft  4. Chronic Pain:  cont Tramadol  5. Gout:  cont allopurinol  6. GERD:  cont Carafate, Cimetidine  7. BPH:  cont Flomax, Finastride  8. Seizures:  cont Divalproex, Keppra  9.  AR:  cont Fluticasone

## 2020-09-08 ENCOUNTER — OUTSIDE SERVICES (OUTPATIENT)
Dept: FAMILY MEDICINE CLINIC | Age: 85
End: 2020-09-08
Payer: MEDICARE

## 2020-09-08 PROCEDURE — 99308 SBSQ NF CARE LOW MDM 20: CPT | Performed by: NURSE PRACTITIONER

## 2020-09-08 NOTE — PROGRESS NOTES
NAME: Elton Arboleda   DATE: 2020    : 6   ROOM[facility]   [facility]   REASON FOR VISIT: Follow up of chronic medical conditions    HPI: Patient seen and examined at bedside. History obtain form patient and chart. Patient resting with eyes closed. Calm. Makes no attempts to communicate. No new concerns per nursing.     PMHx: Seizures, HTN, GERD, Dementia, Anxiety, CAD   PSHx: Cataracts, Shoulder surgery, Cholecystectomy   Social Hx: never smoker, no EtOH   FamHx: HTN, CAD, COPD, Colon CA    Allergies, medications, labs and radiology reports were reviewed through chart review.  Patients past medical, surgical, social and family history have been reviewed and updates were made where appropriate.     Review of Systems (Patient unable to offer any insight)   Positive responses are noted with +     No reports of issues by nursing or other staff.      PHYSICAL EXAM  T, BP, P, RR, Wt  97, 148/68, 60, 16, 171#   General Appearance: Elderly male in NAD   Head: normocephalic and atraumatic   Eyes:pupils equal, round, and reactive to light, conjunctivae and eye lids without erythema   ENT: external ear and ear canal normal bilaterally, nose without deformity, nasal mucosa and turbinates normal without polyps, oropharynx normal, dentition is normal for age, no lip or gum lesions noted   Neck: supple and non-tender without mass, no thyromegaly or thyroid nodules, no cervical lymphadenopathy   Pulmonary/Chest: clear to auscultation bilaterally- no wheezes, rales or rhonchi, normal air movement, no respiratory distress or retractions   Cardiovascular: normal rate, regular rhythm, normal S1 and S2, no murmurs, rubs, clicks, or gallops, distal pulses intact   Abdomen: soft, non-tender, non-distended, no rebound or guarding, no masses or hernias noted, no hepatospleenomegaly   Extremities: no cyanosis, clubbing or edema of the lower extremities   Musculoskeletal: No joint swelling or gross deformity   Neuro: Alert,

## 2020-10-12 ENCOUNTER — OUTSIDE SERVICES (OUTPATIENT)
Dept: FAMILY MEDICINE CLINIC | Age: 85
End: 2020-10-12
Payer: MEDICARE

## 2020-10-12 PROCEDURE — 99308 SBSQ NF CARE LOW MDM 20: CPT | Performed by: NURSE PRACTITIONER

## 2020-10-17 ENCOUNTER — OUTSIDE SERVICES (OUTPATIENT)
Dept: FAMILY MEDICINE CLINIC | Age: 85
End: 2020-10-17
Payer: MEDICARE

## 2020-10-17 PROCEDURE — 99307 SBSQ NF CARE SF MDM 10: CPT | Performed by: NURSE PRACTITIONER

## 2020-10-17 NOTE — PROGRESS NOTES
NAME: Stas Pina   DATE: 10-   : 6   ROOM: [facility]   [facility]   REASON FOR VISIT: Follow up of chronic medical conditions    HPI: Patient seen and examined at bedside. History obtain form patient and chart. Patient is resting in bed with eyes closed. He opens them briefly during assessment, but makes no attempts to communicate, which is his baseline. No appearance of pain or distress.      PMHx: Seizures, HTN, GERD, Dementia, Anxiety, CAD   PSHx: Cataracts, Shoulder surgery, Cholecystectomy   Social Hx: never smoker, no EtOH   FamHx: HTN, CAD, COPD, Colon CA    Allergies, medications, labs and radiology reports were reviewed through chart review.  Patients past medical, surgical, social and family history have been reviewed and updates were made where appropriate.     Review of Systems (Patient unable to offer any insight)   Positive responses are noted with +     No reports of issues by nursing or other staff.      PHYSICAL EXAM  T, BP, P, RR, Wt  97, 137/56, 60, 16, 172#   General Appearance: Elderly male in NAD   Head: normocephalic and atraumatic   Eyes:pupils equal, round, and reactive to light, conjunctivae and eye lids without erythema   ENT: external ear and ear canal normal bilaterally, nose without deformity, nasal mucosa and turbinates normal without polyps, oropharynx normal, dentition is normal for age, no lip or gum lesions noted   Neck: supple and non-tender without mass, no thyromegaly or thyroid nodules, no cervical lymphadenopathy   Pulmonary/Chest: clear to auscultation bilaterally- no wheezes, rales or rhonchi, normal air movement, no respiratory distress or retractions   Cardiovascular: normal rate, regular rhythm, normal S1 and S2, no murmurs, rubs, clicks, or gallops, distal pulses intact   Abdomen: soft, non-tender, non-distended, no rebound or guarding, no masses or hernias noted, no hepatospleenomegaly   Extremities: no cyanosis, clubbing or edema of the lower

## 2020-11-09 ENCOUNTER — OUTSIDE SERVICES (OUTPATIENT)
Dept: FAMILY MEDICINE CLINIC | Age: 85
End: 2020-11-09
Payer: MEDICARE

## 2020-11-09 PROCEDURE — 99307 SBSQ NF CARE SF MDM 10: CPT | Performed by: NURSE PRACTITIONER

## 2020-11-30 ENCOUNTER — OUTSIDE SERVICES (OUTPATIENT)
Dept: FAMILY MEDICINE CLINIC | Age: 85
End: 2020-11-30
Payer: MEDICARE

## 2020-11-30 PROCEDURE — 99309 SBSQ NF CARE MODERATE MDM 30: CPT | Performed by: NURSE PRACTITIONER

## 2020-12-01 NOTE — PROGRESS NOTES
NAME: Britney Mckay   DATE: 2020   : [birth year]   ROOM: [facility]   CODE STATUS: [address] [place] [address] for end of life care    HPI: Patient seen and examined at bedside. Patient is tachycardic, tachypneic, unresponsive and actively dying. He cannot swallow, is not eating or drinking      PMHx: Seizures, HTN, GERD, Dementia, Anxiety, CAD   PSHx: Cataracts, Shoulder surgery, Cholecystectomy   Social Hx: never smoker, no EtOH   FamHx: HTN, CAD, COPD, Colon CA    Allergies, medications, labs and radiology reports were reviewed through chart review. Patients past medical, surgical, social and family history have been reviewed and updates were made where appropriate.     Review of Systems (Patient unable to offer any insight)   Positive responses are noted with +     No reports of issues by nursing or other staff.      PHYSICAL EXAM  T, BP, P, RR, Wt  98, 116/74, 140, 32, 172#   General Appearance: Elderly debilitated, actively dying male in mild distress   Head: normocephalic and atraumatic   Pulmonary/Chest: Clear, diminished, with tachypnea,    Cardiovascular: tachycardia, no murmurs, rubs, clicks, or gallops, distal pulses intact   Abdomen: soft, non-tender, non-distended, no rebound or guarding, no masses or hernias noted, no hepatospleenomegaly   Extremities: no cyanosis, clubbing or edema of the lower extremities   Musculoskeletal: No joint swelling or gross deformity   Neuro: Unresponsive   Psych: Unresponsive     ASSESSMENT AND PLAN   1. Dementia/COVID19/actively dying: Discussed with nursing the need to give patient Ativan and Morphine ASAP as he appears uncomfortable and is actively dying. Continue to provide comfort measures with patient. This provider talked with family yesterday and they are on board with hospice care, who was consulted, and have expressed they want nothing heroic done.      Plan of care reviewed with Dr Chelsey Bauer, CNP